# Patient Record
(demographics unavailable — no encounter records)

---

## 2024-12-02 NOTE — ASSESSMENT
[FreeTextEntry1] : 38 year old female found to have stable Migraine, Elevated Hemoglobin A1c, Obesity, Alpha + thalassemia,  Elevated LFTs, with the current prescription regimen as recommended, diet and lifestyle modifications, as counseled. Prior results reviewed, interpreted and discussed with the patient during today's examination, as appropriate. Follow up, treatment plan and tests, as ordered.   Total time spent:: 25 minutes Including: Preparation prior to visit - Reviewing prior record, results of tests and Consultation Reports as applicable Conducting an appropriate H & P during today's encounter Appropriate orders for tests, medications and procedures, as applicable Counseling patient Note completion

## 2024-12-02 NOTE — HISTORY OF PRESENT ILLNESS
[de-identified] : 38 year old  female patient with history of stable Migraine, Elevated Hemoglobin A1c, Obesity, Alpha + thalassemia,  Elevated LFTs, history as stated, presented for follow up examination. Patient is compliant with all medications. ROS as stated.

## 2024-12-02 NOTE — HISTORY OF PRESENT ILLNESS
[de-identified] : 38 year old  female patient with history of stable Migraine, Elevated Hemoglobin A1c, Obesity, Alpha + thalassemia,  Elevated LFTs, history as stated, presented for follow up examination. Patient is compliant with all medications. ROS as stated.

## 2024-12-02 NOTE — HEALTH RISK ASSESSMENT
[No] : In the past 12 months have you used drugs other than those required for medical reasons? No [No falls in past year] : Patient reported no falls in the past year [0] : 2) Feeling down, depressed, or hopeless: Not at all (0) [Never] : Never [de-identified] : None [[id number]] : 0

## 2024-12-02 NOTE — HEALTH RISK ASSESSMENT
[No] : In the past 12 months have you used drugs other than those required for medical reasons? No [No falls in past year] : Patient reported no falls in the past year [0] : 2) Feeling down, depressed, or hopeless: Not at all (0) [Never] : Never [de-identified] : None [[id number]] : 0

## 2025-05-09 NOTE — PLAN
[FreeTextEntry1] : (+) Incomplete Ab - serial bHCG x 2; / Rx Motrin x 5 days prn for pain relief w/meals; RTO 2 weeks for f/u and management.

## 2025-05-09 NOTE — HISTORY OF PRESENT ILLNESS
[FreeTextEntry1] : Presents for f/u after evaluation and diagnosis of incomplete . LMP(2025), started experiencing pelvic cramping and spotting on Wednesday, then heavy bleeding yesterday, evaluated at [facility] ED w/bHCG~2263.  Pregnancy was unplanned but desired.  Currently experiencing mild pelvic pain, mild vaginal bleeding.   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     LMP(2025) Menarche~11 / q 28 days / 3-4 days - starting light to normal quantity and duration.  Last Pap(25) Neg / (-) HPV.   / s/p STOP x 5 followed by  x 1 (2021-Isa) followed by recent STOP x 1 - Denies history of bladder dysfunction.  Lives w/ FOB, sexually active w/partner, not using contraception.  Living with partner and daughter / Employed - [employer]

## 2025-05-23 NOTE — HISTORY OF PRESENT ILLNESS
[FreeTextEntry1] : Presents for f/u after complete [place]riage:  [facility] [place] - Pt isn't bleeding any longer, preconception counseling provided, pt wants to wait for several months before trying to conceive. Patient was advised her next period should come by 3rd week of June, pt advised to use condoms until next period.    (+)Pre-Diabetic ~ HbA1c~5.9% - dietary changes / exercise reviewed w/pt, encouraged lifestyle changes to improve glycemic index.    (+)Hx/o fibroid uterus - repeat Pelvic U/S in non-pregnant state at [facility] visit.   Vitamin D~15 - Advised to take supplement QOD    (+)Alpha-Thal -silent carrier - s/p GC consulted (03/15/2021 in pregnancy) - Anemia - 10/35 - advised to take multivitamin daily.

## 2025-06-12 NOTE — HEALTH RISK ASSESSMENT
[No] : In the past 12 months have you used drugs other than those required for medical reasons? No [No falls in past year] : Patient reported no falls in the past year [0] : 2) Feeling down, depressed, or hopeless: Not at all (0) [Never] : Never [de-identified] : SANTO [[id number]] : 0

## 2025-06-12 NOTE — HISTORY OF PRESENT ILLNESS
[de-identified] : 39 year old  female patient with history of stable Migraine, Elevated Hemoglobin A1c, Obesity, Alpha + thalassemia,  Elevated LFTs, history as stated, presented for follow up examination. Patient is compliant with all medications. ROS as stated.

## 2025-06-12 NOTE — HEALTH RISK ASSESSMENT
[No falls in past year] : Patient reported no falls in the past year [No] : In the past 12 months have you used drugs other than those required for medical reasons? No [0] : 2) Feeling down, depressed, or hopeless: Not at all (0) [Never] : Never [de-identified] : SANTO [[id number]] : 0

## 2025-06-12 NOTE — HISTORY OF PRESENT ILLNESS
[de-identified] : 39 year old  female patient with history of stable Migraine, Elevated Hemoglobin A1c, Obesity, Alpha + thalassemia,  Elevated LFTs, history as stated, presented for follow up examination. Patient is compliant with all medications. ROS as stated.

## 2025-06-12 NOTE — ASSESSMENT
[FreeTextEntry1] : 39 year old female found to have stable Migraine, Elevated Hemoglobin A1c, Obesity, Alpha + thalassemia,  Elevated LFTs, with the current prescription regimen as recommended, diet and lifestyle modifications, as counseled. Prior results reviewed, interpreted and discussed with the patient during today's examination, as appropriate. Follow up, treatment plan and tests, as ordered.   Total time spent:: 25 minutes Including: Preparation prior to visit - Reviewing prior record, results of tests and Consultation Reports as applicable Conducting an appropriate H & P during today's encounter Appropriate orders for tests, medications and procedures, as applicable Counseling patient Note completion